# Patient Record
Sex: MALE | Race: WHITE | NOT HISPANIC OR LATINO | Employment: OTHER | URBAN - NONMETROPOLITAN AREA
[De-identification: names, ages, dates, MRNs, and addresses within clinical notes are randomized per-mention and may not be internally consistent; named-entity substitution may affect disease eponyms.]

---

## 2022-06-20 ENCOUNTER — OFFICE VISIT (OUTPATIENT)
Dept: FAMILY MEDICINE | Facility: CLINIC | Age: 37
End: 2022-06-20

## 2022-06-20 VITALS
BODY MASS INDEX: 21.19 KG/M2 | HEART RATE: 64 BPM | HEIGHT: 76 IN | RESPIRATION RATE: 16 BRPM | DIASTOLIC BLOOD PRESSURE: 74 MMHG | TEMPERATURE: 98 F | OXYGEN SATURATION: 98 % | SYSTOLIC BLOOD PRESSURE: 132 MMHG | WEIGHT: 174 LBS

## 2022-06-20 DIAGNOSIS — M25.572 ACUTE LEFT ANKLE PAIN: Primary | ICD-10-CM

## 2022-06-20 DIAGNOSIS — S93.402A SPRAIN OF LEFT ANKLE, UNSPECIFIED LIGAMENT, INITIAL ENCOUNTER: ICD-10-CM

## 2022-06-20 DIAGNOSIS — F10.10 ALCOHOL ABUSE: ICD-10-CM

## 2022-06-20 PROCEDURE — 99203 OFFICE O/P NEW LOW 30 MIN: CPT | Mod: ,,, | Performed by: FAMILY MEDICINE

## 2022-06-20 PROCEDURE — 99203 PR OFFICE/OUTPT VISIT, NEW, LEVL III, 30-44 MIN: ICD-10-PCS | Mod: ,,, | Performed by: FAMILY MEDICINE

## 2022-06-20 NOTE — PROGRESS NOTES
Yunior Lane DO   [facility]  [address]  [place]     PATIENT NAME: Ezio Newell  : 1985  DATE: 22  MRN: [id number]      Billing Provider: Yunior Lane DO  Level of Service:   Patient PCP Information     Provider PCP Romain Guidry MD General          Reason for Visit / Chief Complaint: Ankle Pain (Stepped off of schalpholing on Friday. Rolled ankle forwards. Increased swelling with blood pooling in the ankle. His main complaint of pain is medially over the malleolus. 2nd and 3rd MT bruising present also. He is wearing a velcro ankle brace. )       Update PCP  Update Chief Complaint         History of Present Illness / Problem Focused Workflow     Ezio Newell presents to the clinic for LEFT ankle pain that began 3 days ago. Reports was coming off ladder and landed on lateral aspect of LEFT foot and ankle. Experienced immediate pain. Noticed bruising today. Has been working with pain since Friday. No other complaints. No PMH/PSH. NKDA. Admits to drinking 12pack/da. Smokes 2ppd. Has been taking 6 ibuprofen at a time for past day or two.       Review of Systems     Review of Systems   Constitutional: Negative.    HENT: Negative.    Eyes: Negative.    Respiratory: Negative.    Cardiovascular: Negative.    All other systems reviewed and are negative.       Medical / Social / Family History   History reviewed. No pertinent past medical history.    History reviewed. No pertinent surgical history.    Social History  Mr.      Family History  's family history is not on file.    Medications and Allergies     Medications  No outpatient medications have been marked as taking for the 22 encounter (Office Visit) with Yunior Lane DO.       Allergies  Review of patient's allergies indicates:  No Known Allergies    Physical Examination     Vitals:    22 1341   BP: 132/74   BP Location: Right arm   Patient Position: Sitting   Pulse: 64   Resp: 16   Temp: 97.8 °F  "(36.6 °C)   TempSrc: Oral   SpO2: 98%   Weight: 78.9 kg (174 lb)   Height: 6' 4" (1.93 m)      Physical Exam  Vitals and nursing note reviewed.   Constitutional:       General: He is not in acute distress.     Appearance: Normal appearance. He is normal weight. He is not ill-appearing, toxic-appearing or diaphoretic.   Neck:      Vascular: No carotid bruit.   Cardiovascular:      Rate and Rhythm: Normal rate and regular rhythm.      Pulses: Normal pulses.      Heart sounds: Normal heart sounds.   Pulmonary:      Effort: Pulmonary effort is normal.      Breath sounds: Normal breath sounds.   Lymphadenopathy:      Cervical: No cervical adenopathy.   Neurological:      Mental Status: He is alert.          Assessment and Plan (including Health Maintenance)      Problem List  Smart Sets  Document Outside HM   :    Plan:   Walking boot and refer ortho  Advised pt to discontinue ibuprofen if pain allows and to not take tylenol. Does not need to stop drinking but consider allowing taper a later date.       Health Maintenance Due   Topic Date Due    Hepatitis C Screening  Never done    Lipid Panel  Never done    COVID-19 Vaccine (1) Never done    HIV Screening  Never done    TETANUS VACCINE  Never done       Problem List Items Addressed This Visit    None     Visit Diagnoses     Acute left ankle pain    -  Primary    Relevant Orders    X-Ray Ankle Complete 3 View Left    X-Ray Foot Complete 3 view Left        Acute left ankle pain  -     X-Ray Ankle Complete 3 View Left; Future; Expected date: 06/20/2022  -     X-Ray Foot Complete 3 view Left; Future; Expected date: 06/20/2022       Health Maintenance Topics with due status: Not Due       Topic Last Completion Date    Influenza Vaccine Not Due       Procedures     No future appointments.     No follow-ups on file.       Signature:  Yunior Lane DO    Date of encounter: 6/20/22    Education Documentation  No documentation found.  Education Comments  No comments " found.       There are no Patient Instructions on file for this visit.

## 2022-06-23 ENCOUNTER — TELEPHONE (OUTPATIENT)
Dept: ORTHOPEDICS | Facility: CLINIC | Age: 37
End: 2022-06-23

## 2022-06-23 NOTE — TELEPHONE ENCOUNTER
I called to speak to patient regarding a referral we got to see him for an ankle sprain. Dr Rodriguez reviewed the xrays and does not see any fractures. The patient states that he is better but is still having to wear the boot. I instructed him to continue to wear the boot for a couple of weeks and let us know if he does not continue to improve.